# Patient Record
Sex: MALE | Race: BLACK OR AFRICAN AMERICAN | Employment: UNEMPLOYED | URBAN - METROPOLITAN AREA
[De-identification: names, ages, dates, MRNs, and addresses within clinical notes are randomized per-mention and may not be internally consistent; named-entity substitution may affect disease eponyms.]

---

## 2021-10-31 ENCOUNTER — HOSPITAL ENCOUNTER (EMERGENCY)
Age: 66
Discharge: HOME | End: 2021-10-31
Payer: MEDICAID

## 2021-10-31 VITALS
BODY MASS INDEX: 27.12 KG/M2 | RESPIRATION RATE: 16 BRPM | DIASTOLIC BLOOD PRESSURE: 78 MMHG | HEART RATE: 74 BPM | OXYGEN SATURATION: 98 % | WEIGHT: 200 LBS | TEMPERATURE: 97.4 F | SYSTOLIC BLOOD PRESSURE: 134 MMHG

## 2021-10-31 DIAGNOSIS — S61.211A LACERATION OF LEFT INDEX FINGER, INITIAL ENCOUNTER: Primary | ICD-10-CM

## 2021-10-31 PROCEDURE — 99283 EMERGENCY DEPT VISIT LOW MDM: CPT

## 2021-11-01 NOTE — ED PROVIDER NOTES
[address] [facility]        Pt Name: Vasu Bowles  MRN: [id number]  [place] 1955  Date of evaluation: 10/31/2021  Provider: SURESH Davis  PCP: Keya Knight  Note Started: 8:57 PM EDT     The ED Attending Physician was available for consultation but did not see or evaluate this patient. CHIEF COMPLAINT       Chief Complaint   Patient presents with    Laceration     laceration of left index finger, no injury, appears to be split in skin due to dry skin       HISTORY OF PRESENT ILLNESS   (Location, Timing/Onset, Context/Setting, Quality, Duration, Modifying Factors, Severity, Associated Signs and Symptoms)  Note limiting factors. Chief Complaint: Left index finger laceration    Vasu Bowles is a 73 y.o. male who presents with complaint of a cut to his left index finger. Patient has intellectual disability, is here and the company of a caregiver from his group home. Says today he cut his finger, but is not sure how. And says it involves a chair, possibly getting caught when he was getting out of the chair. He says it hurts but he is moving the finger normally. Denies injuries to any other parts of the body. Denies numbness. Nursing Notes were all reviewed and agreed with or any disagreements were addressed in the HPI. REVIEW OF SYSTEMS    (2-9 systems for level 4, 10 or more for level 5)     Review of Systems    Positives and pertinent negatives as per HPI.      PAST MEDICAL HISTORY     Past Medical History:   Diagnosis Date    [facility]F (congestive [facility] failure) ([facility] [address]     Depression     Diabetes mellitus ([facility] [address]     Dry eye syndrome     Episodic dyscontrol syndrome     GERD (gastroesophageal reflux disease)     Hyperlipidemia     Hypertension     Hypoxia     Impulse disorder     Mental retardation     MH (malignant hyperpyrexia)     Mood swings     MR (mental retardation)     Obesity     Personality disorder ([facility] [address]     Scoliosis     Seasonal allergies     Sleep apnea     Tremors of nervous system        SURGICAL HISTORY   No past surgical history on file. CURRENTMEDICATIONS       Previous Medications    ALBUTEROL (PROVENTIL HFA;VENTOLIN HFA) 108 (90 BASE) MCG/ACT INHALER    Inhale 2 puffs into the lungs every 6 hours as needed for Wheezing. ALBUTEROL SULFATE  (90 BASE) MCG/ACT INHALER    Inhale 2 puffs into the lungs every 6 hours as needed for Wheezing    ARTIFICIAL TEAR OP    Apply to eye    ASPIRIN 81 MG TABLET    Take 81 mg by mouth daily    ATORVASTATIN (LIPITOR) 40 MG TABLET    Take 40 mg by mouth daily. ATORVASTATIN (LIPITOR) 40 MG TABLET    Take 40 mg by mouth every evening    BENZTROPINE (COGENTIN) 1 MG TABLET    Take 1 mg by mouth 2 times daily. BENZTROPINE MESYLATE (COGENTIN) 1 MG/ML INJECTION    Infuse 1 mg intravenously 2 times daily    CARBAMAZEPINE (TEGRETOL) 200 MG TABLET    Take 200 mg by mouth 4 times daily. CARBAMAZEPINE (TEGRETOL) 200 MG TABLET    Take 200 mg by mouth 4 times daily    CICLOPIROX (PENLAC) 8 % SOLUTION    Apply  topically nightly. Apply topically nightly. CICLOPIROX (PENLAC) 8 % SOLUTION    Apply topically nightly Indications: to R toe Apply topically nightly. CLOTRIMAZOLE (LOTRIMIN AF) 1 % CREAM    Apply  topically 2 times daily. Apply topically 2 times daily. CLOTRIMAZOLE (LOTRIMIN) 1 % CREAM    Apply topically 2 times daily Apply topically 2 times daily. FLUTICASONE (FLONASE) 50 MCG/ACT NASAL SPRAY    1 spray by Nasal route daily. FLUTICASONE (FLONASE) 50 MCG/ACT NASAL SPRAY    1 spray by Nasal route daily    HALOPERIDOL (HALDOL) 2 MG TABLET    Take 2 mg by mouth 2 times daily    HALOPERIDOL (HALDOL) 5 MG TABLET    Take 20 mg by mouth nightly. HYDROCHLOROTHIAZIDE (HYDRODIURIL) 25 MG TABLET    Take 12.5 mg by mouth daily.     LISINOPRIL (PRINIVIL;ZESTRIL) 5 MG TABLET    Take 5 mg by mouth daily    METFORMIN (GLUCOPHAGE) 500 MG TABLET General: No focal deficit present. Mental Status: He is alert and oriented to person, place, and time. Psychiatric:         Mood and Affect: Mood normal.         Behavior: Behavior normal.         DIAGNOSTIC RESULTS   LABS:    Labs Reviewed - No data to display    When ordered only abnormal lab results are displayed. All other labs were within normal range or not returned as of this dictation. EKG: When ordered, EKG's are interpreted by the Emergency Department Physician in the absence of a cardiologist.  Please see their note for interpretation of EKG. RADIOLOGY:   Non-plain film images such as CT, Ultrasound and MRI are read by the radiologist. Plain radiographic images are visualized and preliminarily interpreted by the ED Provider with the below findings:    Interpretation per the Radiologist below, if available at the time of this note:    No orders to display       CONSULTS:  None    PROCEDURES   Unless otherwise noted below, none. Procedures    EMERGENCY DEPARTMENT COURSE and DIFFERENTIAL DIAGNOSIS/MDM:   Vitals:    Vitals:    10/31/21 2051   BP: 134/78   Pulse: 74   Resp: 16   Temp: 97.4 °F (36.3 °C)   TempSrc: Oral   SpO2: 98%   Weight: 200 lb (90.7 kg)       Patient was given the following medications:  Medications - No data to display        Patient's wound was superficial, no tendon involvement, no nail involvement. It was cleaned and bandaged in the ED. No indication for suturing or imaging. Patient will be discharged with caregiver given instructions for wound care. The patient's caregiver and the patient verbalized understanding and agreement with this plan of care. The patient was advised to return to the emergency department if symptoms should significantly worsen or if new and concerning symptoms should appear.      I estimate there is LOW risk for UNSTABLE FRACTURE, COMPARTMENT SYNDROME, DEEP VENOUS THROMBOSIS, SEPTIC ARTHRITIS, NEUROVASCULAR COMPROMISE, or TENDON/LIGAMENT RUPTURE, thus I consider the discharge disposition reasonable. CRITICAL CARE TIME   None    FINAL IMPRESSION      1.  Laceration of left index finger, initial encounter          DISPOSITION/PLAN   DISPOSITION Decision To Discharge 10/31/2021 08:56:04 PM      PATIENT REFERRED TO:  your family doctor or primary care provider    Call   as needed, for follow-up care      DISCHARGE MEDICATIONS:  New Prescriptions    No medications on file       DISCONTINUED MEDICATIONS:  Discontinued Medications    No medications on file            (Please note that portions of this note were completed with a voice recognition program.  Efforts were made to edit the dictations but occasionally words are mis-transcribed.)    SURESH Strong (electronically signed)       Waqas Jones [facility]  10/31/21 2059

## 2022-10-25 ENCOUNTER — HOSPITAL ENCOUNTER (EMERGENCY)
Age: 67
Discharge: HOME | End: 2022-10-25
Payer: MEDICAID

## 2022-10-25 VITALS
SYSTOLIC BLOOD PRESSURE: 160 MMHG | OXYGEN SATURATION: 96 % | BODY MASS INDEX: 23.22 KG/M2 | DIASTOLIC BLOOD PRESSURE: 98 MMHG | HEIGHT: 64 IN | WEIGHT: 136.02 LBS | RESPIRATION RATE: 19 BRPM | TEMPERATURE: 98.7 F | HEART RATE: 92 BPM

## 2022-10-25 DIAGNOSIS — S00.81XA ABRASION OF FOREHEAD, INITIAL ENCOUNTER: Primary | ICD-10-CM

## 2022-10-25 PROCEDURE — 12001 RPR S/N/AX/GEN/TRNK 2.5CM/<: CPT

## 2022-10-25 PROCEDURE — 99284 EMERGENCY DEPT VISIT MOD MDM: CPT

## 2022-10-25 PROCEDURE — 6360000002 HC RX W HCPCS: Performed by: PHYSICIAN ASSISTANT

## 2022-10-25 PROCEDURE — 90714 TD VACC NO PRESV 7 YRS+ IM: CPT | Performed by: PHYSICIAN ASSISTANT

## 2022-10-25 PROCEDURE — 90471 IMMUNIZATION ADMIN: CPT | Performed by: PHYSICIAN ASSISTANT

## 2022-10-25 RX ADMIN — CLOSTRIDIUM TETANI TOXOID ANTIGEN (FORMALDEHYDE INACTIVATED) AND CORYNEBACTERIUM DIPHTHERIAE TOXOID ANTIGEN (FORMALDEHYDE INACTIVATED) 0.5 ML: 5; 2 INJECTION, SUSPENSION INTRAMUSCULAR at 11:22

## 2022-10-25 ASSESSMENT — PAIN - FUNCTIONAL ASSESSMENT
PAIN_FUNCTIONAL_ASSESSMENT: NONE - DENIES PAIN
PAIN_FUNCTIONAL_ASSESSMENT: NONE - DENIES PAIN

## 2022-10-25 NOTE — ED PROVIDER NOTES
[address] [facility]        Pt Name: Ninfa Randolph  MRN: [id number]  [place] 1955  Date of evaluation: 10/25/2022  Provider: SURESH Hankins  PCP: Pcp No  Note Started: 1:58 PM EDT     The ED Attending Physician was available for consultation but did not see or evaluate this patient. CHIEF COMPLAINT       Chief Complaint   Patient presents with    Abrasion     Pt arrives ambulatory with care taker for eval of head abrasion from hitting head on soap guo       HISTORY OF PRESENT ILLNESS   (Location, Timing/Onset, Context/Setting, Quality, Duration, Modifying Factors, Severity, Associated Signs and Symptoms)  Note limiting factors. Ninfa Randolph is a 78 y.o. male who presents with report of an injury to his scalp. Patient has permanent cognitive disability, and he arrives in the company of a caregiver. Patient reports that in the shower he hit his forehead on something, probably a soap dish, and has a scrape on his forehead. Denies injuries to any other parts of the body. Says he did not fall. No loss of consciousness. He denies headache presently. Says there was some bleeding but the bleeding stopped. Does not take blood thinning medication. He has no other complaints. Does not know when his last tetanus vaccination was. Nursing Notes were all reviewed and agreed with or any disagreements were addressed in the HPI. REVIEW OF SYSTEMS    (2-9 systems for level 4, 10 or more for level 5)     Positives and pertinent negatives as per HPI.      PAST MEDICAL HISTORY     Past Medical History:   Diagnosis Date    CHF (congestive heart failure) ([facility] [address]     Depression     Diabetes mellitus ([facility] [address]     Dry eye syndrome     Episodic dyscontrol syndrome     GERD (gastroesophageal reflux disease)     Hyperlipidemia     Hypertension     Hypoxia     Impulse disorder     Mental retardation     MH (malignant hyperpyrexia)     Mood swings SUBJECTIVE:   Charles Carrillo is 60 year old male that presents today for Transitional Care Management.  Patient is accompanied by his significant other today.    He was discharged from the hospital on 12/27/17.  The Discharge Summary was reviewed.  He was admitted for a recurrent bout of sigmoid diverticulitis that had failed outpatient therapy. Then, he is unable to take Cipro secondary to Achilles tendon issues. Was also having a fair amount of bright red blood per rectum with this episode. He did not have really any significant drop in his hemoglobin level, he was treated with supportive care IV fluids, bowel rest and IV antibiotics. He was discharged initially still on his aspirin however he did return to the ER in 12/29 for ongoing rectal bleeding, again hemoglobin was pretty stable at that point, but his aspirin was discontinued. He has not had any recurrent episodes since then. He remains on a very bland diet, focusing on soft foods at this point. Since 2014 this is probably his fourth and fifth episode of diverticulitis, we did discuss the possibility seeking general surgery opinion, he is not interested in this at this time.    Pertinent unfinalized hospital performed diagnostic/lab tests - none  Services ordered - none  DME/Assistive devices prescribed - none  Advanced Directives - not on file, given copy    Discharge medications were verified with the patient.  Outpatient medications were updated today.  He is fully compliant with the medication regimen prescribed at the time of discharge.  Current Outpatient Prescriptions   Medication Sig Dispense Refill   • acetaminophen-codeine (TYLENOL NO.3) 300-30 MG per tablet Take 1-2 tablets by mouth every 6 hours as needed for Pain. 20 tablet 0   • lisinopril (ZESTRIL) 20 MG tablet Take 1 tablet by mouth daily. 90 tablet 3   • Probiotic Product (PROBIOTIC DAILY) Cap Take 1 capsule by mouth daily.     • tadalafil (CIALIS) 20 MG tablet Take 1 tablet by mouth as  MR (mental retardation)     Obesity     Personality disorder ([facility] [address]     Scoliosis     Seasonal allergies     Sleep apnea     Tremors of nervous system        SURGICAL HISTORY   No past surgical history on file. [address]       Discharge Medication List as of 10/25/2022 11:25 AM        CONTINUE these medications which have NOT CHANGED    Details   aspirin 81 MG tablet Take 81 mg by mouth dailyHistorical Med      lisinopril (PRINIVIL;ZESTRIL) 5 MG tablet Take 5 mg by mouth daily      ranitidine (ZANTAC) 150 MG capsule Take 150 mg by mouth 2 times daily      !! fluticasone (FLONASE) 50 MCG/ACT nasal spray 1 spray by Nasal route daily      !! carBAMazepine (TEGRETOL) 200 MG tablet Take 200 mg by mouth 4 times daily      !! atorvastatin (LIPITOR) 40 MG tablet Take 40 mg by mouth every evening      benztropine mesylate (COGENTIN) 1 MG/ML injection Infuse 1 mg intravenously 2 times daily      OLANZapine (ZYPREXA) 10 MG injection Inject into the muscle once as needed for Agitation      !! metFORMIN (GLUCOPHAGE) 500 MG tablet Take 500 mg by mouth 2 times daily (with meals)      ! ! haloperidol (HALDOL) 2 MG tablet Take 2 mg by mouth 2 times daily      !! mirtazapine (REMERON) 30 MG tablet Take 30 mg by mouth nightly      !! ciclopirox (PENLAC) 8 % solution Apply topically nightly Indications: to R toe Apply topically nightly., Topical, Historical Med      !! albuterol sulfate  (90 BASE) MCG/ACT inhaler Inhale 2 puffs into the lungs every 6 hours as needed for Wheezing      ARTIFICIAL TEAR OP Apply to eye      !! clotrimazole (LOTRIMIN) 1 % cream Apply topically 2 times daily Apply topically 2 times daily. , Topical, Historical Med      ranitidine (ZANTAC) 150 MG tablet Take 150 mg by mouth 2 times daily. !! fluticasone (FLONASE) 50 MCG/ACT nasal spray 1 spray by Nasal route daily. !! carBAMazepine (TEGRETOL) 200 MG tablet Take 200 mg by mouth 4 times daily.       !! atorvastatin (LIPITOR) 40 MG needed for Erectile Dysfunction. 15 tablet 0   • pravastatin (PRAVACHOL) 40 MG tablet Take 1 tablet by mouth daily. 90 tablet 2   • cycloSPORINE (RESTASIS) 0.05 % ophthalmic emulsion Place 1 drop into both eyes 2 times daily. 60 vial 12   • latanoprost (XALATAN) 0.005 % ophthalmic solution Place 1 drop into both eyes nightly. 2.5 mL 12   • diclofenac (VOLTAREN) 75 MG EC tablet Take 1 tablet by mouth 2 times daily. 60 tablet 0   • naproxen sodium (ALEVE) 220 MG tablet Take 220 mg by mouth 2 times daily as needed.     • Pseudoephedrine-APAP-DM (DAYQUIL MULTI-SYMPTOM PO) Take 1 tablet by mouth as needed (ALLERGIES).     • aspirin-acetaminophen-caffeine (EXCEDRIN MIGRAINE) 250-250-65 MG per tablet Take 1 tablet by mouth every 6 hours as needed for Pain.     • meclizine HCl (ANTIVERT) 25 MG tablet Take 1 tablet by mouth 3 times daily as needed for Dizziness or Nausea. Sedation precautions while on meds 30 tablet 0   • fluticasone (FLONASE) 50 MCG/ACT nasal spray Spray 2 sprays in each nostril daily. (Patient taking differently: Spray 2 sprays in each nostril daily as needed. ) 16 g 0   • aspirin 81 MG EC tablet Take 1 tablet by mouth daily.       No current facility-administered medications for this visit.        I have personally reviewed and updated the following EMR sections: Allergies, Problem List, Past Medical History, Past Surgical History and Social History.    Comprehensive ROS is completed today and negative except for as stated above.      PHYSICAL EXAMINATION:   Visit Vitals  /78 (BP [place] [facility] [place] Position: Sitting, Cuff Size: Large Adult)   Pulse 69   Temp 97.9 °F (36.6 °C) (Oral)   Ht 5' 6\" (1.676 m)   Wt 112 kg   SpO2 98%   BMI 39.85 kg/m²     Gen:  NAD.   Psych:  A&O x3.  Appropriate affect.    Skin:  Warm and dry.  No rashes or lesions to inspection or palpation.    Eyes:  Conjunctivae and lids appear normal.  PERRL.   ENMT:  Ear canals free of cerumen and TMs normal.  Moist mucous  membranes.  OP clear.     Neck:  Symmetric, no masses.  No thyromegaly.    Resp:  Normal effort.  CTAB.  CV:  RRR, no MRGs.  No edema.  No carotid bruits.  GI:  Soft, minimal tenderness throughout bilateral lower quadrants without rebound or guarding.  No hepatosplenomegaly.      ASSESSMENT/PLAN:  Charles was seen today for transitional care management.    Diagnoses and all orders for this visit:    Diverticulitis of sigmoid colon    Achilles tendon pain  -     SERVICE TO ORTHOPEDICS    Continues to recover from the diverticulitis he will complete course of antibiotics, he will remain off aspirin for the next 7-10 days. He will slowly advance his diet as tolerated. Not interested in Gen. surgery referral at this time and evidently consider this. Anymore episodes.    Patient adherence to his treatment plan was assessed.  He is fully compliant with the entire discharge treatment plan.               tablet Take 40 mg by mouth daily. benztropine (COGENTIN) 1 MG tablet Take 1 mg by mouth 2 times daily. OLANZapine (ZYPREXA) 10 MG tablet Take 10 mg by mouth 2 times daily. hydrochlorothiazide (HYDRODIURIL) 25 MG tablet Take 12.5 mg by mouth daily. !! metFORMIN (GLUCOPHAGE) 500 MG tablet Take 500 mg by mouth 2 times daily (with meals). !! haloperidol (HALDOL) 5 MG tablet Take 20 mg by mouth nightly. !! mirtazapine (REMERON) 30 MG tablet Take 30 mg by mouth nightly. !! ciclopirox (PENLAC) 8 % solution Apply  topically nightly. Apply topically nightly., Topical, Historical Med      !! albuterol (PROVENTIL HFA;VENTOLIN HFA) 108 (90 BASE) MCG/ACT inhaler Inhale 2 puffs into the lungs every 6 hours as needed for Wheezing. !! clotrimazole (LOTRIMIN AF) 1 % cream Apply  topically 2 times daily. Apply topically 2 times daily. , Topical, Historical Med       !! - Potential duplicate medications found. Please discuss with provider. ALLERGIES     Patient has no known allergies. FAMILYHISTORY     No family history on file. SOCIAL HISTORY       Social History     Tobacco Use    Smoking status: Never    Smokeless tobacco: Never   Vaping Use    Vaping Use: Never used   Substance Use Topics    Alcohol use: No    Drug use: No       SCREENINGS    [place] Coma Scale  Eye Opening: Spontaneous  Best Verbal Response: Oriented  Best Motor Response: Obeys commands  [place] Coma Scale Score: 15      PHYSICAL EXAM    (up to 7 for level 4, 8 or more for level 5)     ED Triage Vitals [10/25/22 1003]   BP Temp Temp Source Heart Rate Resp SpO2 Height Weight   (!) 160/98 98.7 °F (37.1 °C) Oral 92 19 96 % 5' 4\" (1.626 m) 136 lb 0.4 oz (61.7 kg)       Physical Exam  Vitals and nursing note reviewed. Constitutional:       General: He is not in acute distress. Appearance: Normal appearance. He is not ill-appearing. HENT:      Head: Normocephalic and atraumatic. Comments:  There is a superficial laceration about 1 cm in the right sided parietal scalp, anterior portion, no active bleeding. Negative for surrounding tenderness or swelling. Negative for raccoon eyes or lofton's sign. Negative for mastoid tenderness bilaterally. Negative for tenderness to palpation throughout the facial bones. Nose: Nose normal.   Eyes:      General:         Right eye: No discharge. Left eye: No discharge. Pulmonary:      Effort: Pulmonary effort is normal. No respiratory distress. Musculoskeletal:         General: Normal range of motion. Cervical back: Normal range of motion. Skin:     General: Skin is warm and dry. Neurological:      General: No focal deficit present. Mental Status: He is alert and oriented to person, place, and time. Psychiatric:         Mood and Affect: Mood normal.         Behavior: Behavior normal.       DIAGNOSTIC RESULTS   LABS:    Labs Reviewed - No data to display    When ordered only abnormal lab results are displayed. All other labs were within normal range or not returned as of this dictation. EKG: When ordered, EKG's are interpreted by the Emergency Department Physician in the absence of a cardiologist.  Please see their note for interpretation of EKG. RADIOLOGY:   All images such as plain radiographs, CT, Ultrasound and MRI are interpreted by a radiologist. Some images are visualized and preliminarily interpreted by me and/or the ED attending physician. Interpretation per the radiologist below, if available at the time of this note:    No orders to display       CONSULTS:  None    PROCEDURES   Laceration Repair  The patient has a laceration located on the forehead/scalp, as described above. The laceration was thoroughly cleaned with surgical scrub and flushed with sterile saline. No foreign bodies were noted. There was no tendon or muscular involvement.   The laceration was covered with a single layer of Dermabond, and the patient tolerated the procedure well. The patient was advised and given printed information on caring for the wound after discharge. The patient was also advised of the signs of infection such as fever, increased redness and swelling, or pus discharge that would warrant a return to the emergency department. EMERGENCY DEPARTMENT COURSE and DIFFERENTIAL DIAGNOSIS/MDM:   Vitals:    Vitals:    10/25/22 1003   BP: (!) 160/98   Pulse: 92   Resp: 19   Temp: 98.7 °F (37.1 °C)   TempSrc: Oral   SpO2: 96%   Weight: 136 lb 0.4 oz (61.7 kg)   Height: 5' 4\" (1.626 m)       Patient was given the following medications:  Medications   tetanus & diphtheria toxoids (adult) 5-2 LFU injection 0.5 mL (0.5 mLs IntraMUSCular Given 10/25/22 1122)           Is this patient to be included in the SEP-1 Core Measure due to severe sepsis or septic shock? No   Exclusion criteria - the patient is NOT to be included for SEP-1 Core Measure due to: Infection is not suspected    Patient's scalp wound was superficial, no loss of consciousness, no other injuries. No neurological deficits on exam.  Suspicion for any acute intracranial abnormality was very low. Wound was cleaned and closed with Dermabond. Tetanus vaccination was updated. Patient will be discharged with wound care instructions. The patient and his caregiver at bedside verbalized understanding and agreement with this plan of care. The patient was advised to return to the emergency department if symptoms should significantly worsen or if new and concerning symptoms should appear. I estimate there is LOW risk for SKULL FRACTURE, SUBARACHNOID HEMORRHAGE, INTRACRANIAL HEMORRHAGE, CERVICAL SPINE INJURY, SUBDURAL OR EPIDURAL HEMATOMA,  thus I consider the discharge disposition reasonable. CRITICAL CARE TIME   None    FINAL IMPRESSION      1.  Abrasion of forehead, initial encounter          DISPOSITION/PLAN   DISPOSITION Decision To Discharge 10/25/2022 11:18:37 AM      PATIENT REFERRED TO:  your family doctor or primary care provider    Call   as needed, for follow-up care      DISCHARGE MEDICATIONS:  Discharge Medication List as of 10/25/2022 11:25 AM          DISCONTINUED MEDICATIONS:  Discharge Medication List as of 10/25/2022 11:25 AM               (Please note that portions of this note were completed with a voice recognition program.  Efforts were made to edit the dictations but occasionally words are mis-transcribed.)    SURESH Cobb (electronically signed)       Kendra Cobb  10/25/22 8286

## 2022-10-25 NOTE — ED TRIAGE NOTES
Pt arrives ambulatory with care taker for eval of head abrasion from hitting head on soap guo. Pt is a/ox4, rsp nonlabored and pwd.

## 2023-04-01 ENCOUNTER — HOSPITAL ENCOUNTER (EMERGENCY)
Age: 68
Discharge: HOME | End: 2023-04-01
Attending: EMERGENCY MEDICINE
Payer: MEDICAID

## 2023-04-01 VITALS
BODY MASS INDEX: 31.99 KG/M2 | HEART RATE: 61 BPM | DIASTOLIC BLOOD PRESSURE: 77 MMHG | TEMPERATURE: 97.3 F | WEIGHT: 187.39 LBS | OXYGEN SATURATION: 97 % | HEIGHT: 64 IN | RESPIRATION RATE: 14 BRPM | SYSTOLIC BLOOD PRESSURE: 135 MMHG

## 2023-04-01 DIAGNOSIS — S02.5XXB OPEN FRACTURE OF TOOTH, INITIAL ENCOUNTER: Primary | ICD-10-CM

## 2023-04-01 PROCEDURE — 99283 EMERGENCY DEPT VISIT LOW MDM: CPT

## 2023-04-01 PROCEDURE — 6370000000 HC RX 637 (ALT 250 FOR IP): Performed by: PHYSICIAN ASSISTANT

## 2023-04-01 RX ORDER — PENICILLIN V POTASSIUM 250 MG/1
500 TABLET ORAL ONCE
Status: COMPLETED | OUTPATIENT
Start: 2023-04-01 | End: 2023-04-01

## 2023-04-01 RX ORDER — PENICILLIN V POTASSIUM 500 MG/1
500 TABLET ORAL 3 TIMES DAILY
Qty: 21 TABLET | Refills: 0 | Status: SHIPPED | OUTPATIENT
Start: 2023-04-01 | End: 2023-04-08

## 2023-04-01 RX ORDER — IBUPROFEN 600 MG/1
600 TABLET ORAL 3 TIMES DAILY PRN
Qty: 20 TABLET | Refills: 0 | Status: SHIPPED | OUTPATIENT
Start: 2023-04-01

## 2023-04-01 RX ADMIN — PENICILLIN V POTASSIUM 500 MG: 250 TABLET, FILM COATED ORAL at 22:50

## 2023-04-02 NOTE — ED PROVIDER NOTES
Examined discussed with MLP agree with plan. Briefly this is a 50-year-old -American gentleman who had some minimal dental trauma and is essentially partially avulsed one of the upper right incisors. On examination most of the tooth is still in place but it looks like the anterior enamel has come off. Our plan at this point time is urgent dental follow-up we will go ahead and place him on antibiotics since he does have exposed pulp.      Lorelei Markham MD  04/01/23 5045

## 2023-04-02 NOTE — ED NOTES
D/C: Order noted for d/c. Pt confirmed d/c paperwork  have correct name. Discharge and education instructions reviewed with patient. Teach-back successful. Pt verbalized understanding and signed d/c papers. Pt denied questions at this time. No acute distress noted. Patient instructed to follow-up as noted - return to emergency department if symptoms worsen. Patient verbalized understanding. Discharged per EDMD with discharge instructions. Pt discharged  to private vehicle. Patient stable upon departure. Thanked patient for choosing [facility]) for care.           Naga Moyer RN  04/01/23 4127

## 2023-04-02 NOTE — ED PROVIDER NOTES
HISTORY   History reviewed. No pertinent surgical history. CURRENTMEDICATIONS       Previous Medications    ALBUTEROL (PROVENTIL HFA;VENTOLIN HFA) 108 (90 BASE) MCG/ACT INHALER    Inhale 2 puffs into the lungs every 6 hours as needed for Wheezing. ALBUTEROL SULFATE  (90 BASE) MCG/ACT INHALER    Inhale 2 puffs into the lungs every 6 hours as needed for Wheezing    ARTIFICIAL TEAR OP    Apply to eye    ASPIRIN 81 MG TABLET    Take 81 mg by mouth daily    ATORVASTATIN (LIPITOR) 40 MG TABLET    Take 40 mg by mouth daily. ATORVASTATIN (LIPITOR) 40 MG TABLET    Take 40 mg by mouth every evening    BENZTROPINE (COGENTIN) 1 MG TABLET    Take 1 mg by mouth 2 times daily. BENZTROPINE MESYLATE (COGENTIN) 1 MG/ML INJECTION    Infuse 1 mg intravenously 2 times daily    CARBAMAZEPINE (TEGRETOL) 200 MG TABLET    Take 200 mg by mouth 4 times daily. CARBAMAZEPINE (TEGRETOL) 200 MG TABLET    Take 200 mg by mouth 4 times daily    CICLOPIROX (PENLAC) 8 % SOLUTION    Apply  topically nightly. Apply topically nightly. CICLOPIROX (PENLAC) 8 % SOLUTION    Apply topically nightly Indications: to R toe Apply topically nightly. CLOTRIMAZOLE (LOTRIMIN AF) 1 % CREAM    Apply  topically 2 times daily. Apply topically 2 times daily. CLOTRIMAZOLE (LOTRIMIN) 1 % CREAM    Apply topically 2 times daily Apply topically 2 times daily. FLUTICASONE (FLONASE) 50 MCG/ACT NASAL SPRAY    1 spray by Nasal route daily. FLUTICASONE (FLONASE) 50 MCG/ACT NASAL SPRAY    1 spray by Nasal route daily    HALOPERIDOL (HALDOL) 2 MG TABLET    Take 2 mg by mouth 2 times daily    HALOPERIDOL (HALDOL) 5 MG TABLET    Take 20 mg by mouth nightly. HYDROCHLOROTHIAZIDE (HYDRODIURIL) 25 MG TABLET    Take 12.5 mg by mouth daily. LISINOPRIL (PRINIVIL;ZESTRIL) 5 MG TABLET    Take 5 mg by mouth daily    METFORMIN (GLUCOPHAGE) 500 MG TABLET    Take 500 mg by mouth 2 times daily (with meals).     METFORMIN (GLUCOPHAGE) 500 MG TABLET    Take vital signs. He will be discharged with prescriptions for antibiotic treatment and anti-inflammatory medication and given contact info for dental follow-up. The patient verbalized understanding and agreement with this plan of care. The patient was advised to return to the emergency department if symptoms should significantly worsen or if new and concerning symptoms should appear. I estimate there is LOW risk for a DEEP SPACE INFECTION (e.g., ELYS ANGINA OR RETROPHARYNGEAL ABSCESS), MENINGITIS, or AIRWAY COMPROMISE, thus I consider the discharge disposition reasonable. Also, there is no evidence or peritonitis, sepsis, or toxicity. CRITICAL CARE TIME   None    FINAL IMPRESSION      1.  Open fracture of tooth, initial encounter          DISPOSITION/PLAN   DISPOSITION Decision To Discharge 04/01/2023 10:42:28 PM      PATIENT REFERRED TO:  See attached list of area dental clinics    Go to   For follow-up care      DISCHARGE MEDICATIONS:  New Prescriptions    IBUPROFEN (ADVIL;MOTRIN) 600 MG TABLET    Take 1 tablet by mouth 3 times daily as needed for Pain    PENICILLIN V POTASSIUM (VEETID) 500 MG TABLET    Take 1 tablet by mouth 3 times daily for 7 days       DISCONTINUED MEDICATIONS:  Discontinued Medications    No medications on file            (Please note that portions of this note were completed with a voice recognition program.  Efforts were made to edit the dictations but occasionally words are mis-transcribed.)    SURESH [place] (electronically signed)       Mary Bagley, [id number]  04/01/23 3121